# Patient Record
(demographics unavailable — no encounter records)

---

## 2025-04-19 NOTE — ASSESSMENT
[FreeTextEntry1] : Left knee pain for a month with no injury. PF OA present. Possible arthritic flare up, possible meniscus tear.  At this point, this is a new condition where the diagnosis and the long-term prognosis is uncertain versus an exacerbation of a chronic condition.  *The patient has a history of the following orthopaedic issues which are stable and not being managed on this visit.  right knee bump which she noticed in july 2022.   mri 2023 shows mmt, oa.  pain but improves with pt.   left ankle pain for months.  no injury.  ttp achilles tendon.  achilles intact on exam.  likely tendonitis/ strain. only hurts after she plays tennnis.  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

## 2025-04-19 NOTE — HISTORY OF PRESENT ILLNESS
[de-identified] : 2/3/23:  right knee bump which she noticed in july 2022.  no injury.  no fevers or chills.  no swelling noted.  reports to pain and tightness chaim in the back of the knee. 2/10/23:  improved but still has pain.   7/21/23:   right knee pain with activities.   now left ankle pain for months.  no injury.  8/25/23:   follow up right knee.  improving with pt but still pain.   hasnt started on pt for left ankle due to insurance.  also some left knee soreness from compensating.  04/18/25: Right knee painstable.  Increased pain left knee.

## 2025-04-19 NOTE — DISCUSSION/SUMMARY
[de-identified] : The patient has one or more conditions that would benefit from physical therapy.  The physical therapy is requested to improve any deficit in pain, range of motion, strength and other problems in the affected body part(s) as noted in the physical examination above.  A prescription for physical therapy 2 - 3 times per week for 6 weeks was prescribed for the following body parts. Left knee.   The patient's orthopaedic condition(s) warrants intermittent use of a prescription strength non-steroidal anti-inflammatory medication (IBUPROFEN 600MG Po Bid PRN).  This medication is associated with risks including but not limited to gastrointestinal irritation, kidney damage, hypertension, and bleeding.  The patient understands and will take medication as prescribed.  The patient will stop the medication and consult a physician as needed if problems arise.  Will schedule the patient for an MRI of the left knee to assess the problem pending insurance authorization if necessary.  The patient has continued symptoms despite various treatment modalities.  The MRI will help to clarify the diagnosis and subsequent treatment plan. Rule out MMT, LMT.   Follow up after MRI.   Will consider corticosteroid injection therapy if symptoms persist.

## 2025-04-19 NOTE — PHYSICAL EXAM
[Right] : right knee [Degenerative change] : Degenerative change [Left] : left knee [NL (0)] : extension 0 degrees [4___] : quadriceps 4[unfilled]/5 [5___] : hamstring 5[unfilled]/5 [] : light touch is intact throughout [TWNoteComboBox7] : flexion 120 degrees

## 2025-04-19 NOTE — DISCUSSION/SUMMARY
[de-identified] : The patient has one or more conditions that would benefit from physical therapy.  The physical therapy is requested to improve any deficit in pain, range of motion, strength and other problems in the affected body part(s) as noted in the physical examination above.  A prescription for physical therapy 2 - 3 times per week for 6 weeks was prescribed for the following body parts. Left knee.   The patient's orthopaedic condition(s) warrants intermittent use of a prescription strength non-steroidal anti-inflammatory medication (IBUPROFEN 600MG Po Bid PRN).  This medication is associated with risks including but not limited to gastrointestinal irritation, kidney damage, hypertension, and bleeding.  The patient understands and will take medication as prescribed.  The patient will stop the medication and consult a physician as needed if problems arise.  Will schedule the patient for an MRI of the left knee to assess the problem pending insurance authorization if necessary.  The patient has continued symptoms despite various treatment modalities.  The MRI will help to clarify the diagnosis and subsequent treatment plan. Rule out MMT, LMT.   Follow up after MRI.   Will consider corticosteroid injection therapy if symptoms persist.

## 2025-04-19 NOTE — HISTORY OF PRESENT ILLNESS
Pulmonary function test stable     Continue Flovent 110, 2 puffs twice per day with spacer.  Brush teeth afterward      At first sign of illness, start azithromycin x 5 days      Continue albuterol every 4-6 hours as needed for cough/wheeze      Continue all allergy medications    Seek emergency care for increased work of breathing      Return to office again in 3 months [de-identified] : 2/3/23:  right knee bump which she noticed in july 2022.  no injury.  no fevers or chills.  no swelling noted.  reports to pain and tightness chaim in the back of the knee. 2/10/23:  improved but still has pain.   7/21/23:   right knee pain with activities.   now left ankle pain for months.  no injury.  8/25/23:   follow up right knee.  improving with pt but still pain.   hasnt started on pt for left ankle due to insurance.  also some left knee soreness from compensating.  04/18/25: Right knee painstable.  Increased pain left knee.

## 2025-05-06 NOTE — PHYSICAL EXAM
[Left] : left knee [NL (0)] : extension 0 degrees [4___] : quadriceps 4[unfilled]/5 [5___] : hamstring 5[unfilled]/5 [] : light touch is intact throughout [TWNoteComboBox7] : flexion 120 degrees

## 2025-05-06 NOTE — HISTORY OF PRESENT ILLNESS
[de-identified] : 2/3/23:  right knee bump which she noticed in july 2022.  no injury.  no fevers or chills.  no swelling noted.  reports to pain and tightness chaim in the back of the knee. 2/10/23:  improved but still has pain.   7/21/23:   right knee pain with activities.   now left ankle pain for months.  no injury.  8/25/23:   follow up right knee.  improving with pt but still pain.   hasnt started on pt for left ankle due to insurance.  also some left knee soreness from compensating.  04/18/25: Right knee painstable.  Increased pain left knee. 05/06/25 - follow up for MRI review of left knee pain.  continued left knee pain.

## 2025-05-06 NOTE — DATA REVIEWED
[MRI] : MRI [Left] : left [Knee] : knee [I independently reviewed and interpreted images and report] : I independently reviewed and interpreted images and report [FreeTextEntry1] : MRI Left knee (OC) 4/21/25 - severe pf oa, mmt, effusion, bakers cyst

## 2025-05-06 NOTE — ASSESSMENT
[FreeTextEntry1] : Left knee pain for a month with no injury.  PF OA present.  MRI Left knee (OC) 4/21/25 - severe pf oa, mmt, effusion, bakers cyst.  Continued knee pain.    *The patient has a history of the following orthopaedic issues which are stable and not being managed on this visit.  - right knee bump which she noticed in july 2022.   mri 2023 shows mmt, oa.  pain but improves with pt.  - left ankle pain for months.  no injury.  ttp achilles tendon.  achilles intact on exam.  likely tendonitis/ strain. only hurts after she plays tennnis.  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

## 2025-05-06 NOTE — DISCUSSION/SUMMARY
[de-identified] : The patient has one or more conditions that would benefit from physical therapy.  The physical therapy is requested to improve any deficit in pain, range of motion, strength and other problems in the affected body part(s) as noted in the physical examination above.  A prescription for physical therapy 2 - 3 times per week for 6 weeks was prescribed for the following body parts.  Left knee.   The patient should perform a home exercise program as directed. The patient should focus on the body parts affected as discussed above.  Left knee.   The patient's orthopaedic condition(s) warrants consideration of consistent or intermittent use of a prescription strength non-steroidal anti-inflammatory medication (IBUPROFEN 600MG po BID prn).  This medication is associated with risks including but not limited to gastrointestinal irritation, kidney damage, hypertension, and bleeding. The patient notes they already have a valid prescription for the medication. The patient understands the risks and will take medication as prescribed previously.  The patient will stop the medication and consult a physician as needed if problems arise.  Injection(s) as noted in procedure narrative.  Follow up in 6 weeks.

## 2025-05-06 NOTE — PROCEDURE
[FreeTextEntry3] : The patient has continued pain despite attempts at treatment including but not limited to rest, ice, aspirin, Ibuprofen, Aleve, Tylenol etc or prescription NSAIDS, and/or exercises at home and/ or physical therapy without significant improvement.  As a result, a cortisone injection was performed in the office. The risks, benefits, and alternatives of a cortisone injection were explained in full to the patient. Risks outlined include but are not limited to infection, sepsis, bleeding, scarring, skin discoloration, temporary increase in pain, syncopal episode, failure to resolve symptoms, allergic reaction, symptom recurrence, and elevation of blood sugar in diabetics. The patient understood the risks. All questions were answered.   Oral informed consent was obtained.  The medication used was 3 cc of 1% Lidocaine, 3 cc of .5% Marcaine and 2 cc or 12mg of Celestone.  The injection site was cleaned using betadine or alcohol to sterilize the area. Sterile technique was utilized for the procedure including the preparation of the solutions used for the injection. The medication was injected into the LEFT KNEE JOINT.  A bandage or band aid was applied after the injection.  The patient tolerated the procedure well and was advised to ice the injection site this evening.  Other post procedure instructions were also discussed including to call if redness, pain, fever, or any other problems occur and to apply ice for 15 min. out of every hour for the next 12-24 hours as tolerated. The patient was advised to rest the joint(s) for 2 days..    The injection was performed using ultrasound guidance to confirm accurate placement of the needle into the appropriate position. Visualization of the needle and placement of the injection was performed without complication as noted above.

## 2025-06-18 NOTE — ASSESSMENT
[FreeTextEntry1] : Left knee pain for a month with no injury.  PF OA present.  MRI Left knee (OC) 4/21/25 - severe pf oa, mmt, effusion, bakers cyst.   CSI on 5/6/25 with relief.  Improving with PT.    *The patient has a history of the following orthopaedic issues which are stable and not being managed on this visit.  - right knee bump which she noticed in july 2022.   mri 2023 shows mmt, oa.  pain but improves with pt.  - left ankle pain for months.  no injury.  ttp achilles tendon.  achilles intact on exam.  likely tendonitis/ strain. only hurts after she plays tennnis.  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

## 2025-06-18 NOTE — HISTORY OF PRESENT ILLNESS
[de-identified] : 2/3/23:  right knee bump which she noticed in july 2022.  no injury.  no fevers or chills.  no swelling noted.  reports to pain and tightness chaim in the back of the knee. 2/10/23:  improved but still has pain.   7/21/23:   right knee pain with activities.   now left ankle pain for months.  no injury.  8/25/23:   follow up right knee.  improving with pt but still pain.   hasnt started on pt for left ankle due to insurance.  also some left knee soreness from compensating.  04/18/25: Right knee painstable.  Increased pain left knee. 05/06/25 - follow up for MRI review of left knee pain.  continued left knee pain. 06/17/25- Improving with physical therapy. stated pain is medial of knee. no tightness.

## 2025-06-18 NOTE — DISCUSSION/SUMMARY
[de-identified] : The patient has one or more conditions that would benefit from physical therapy.  The physical therapy is requested to improve any deficit in pain, range of motion, strength and other problems in the affected body part(s) as noted in the physical examination above.  A prescription for physical therapy 2 - 3 times per week for 6 weeks was prescribed for the following body parts.  Left knee.   The patient should perform a home exercise program as directed. The patient should focus on the body parts affected as discussed above.  The patient's orthopaedic condition(s) warrants consideration of consistent or intermittent use of a prescription strength non-steroidal anti-inflammatory medication (IBUPROFEN 600MG po BID prn).  This medication is associated with risks including but not limited to gastrointestinal irritation, kidney damage, hypertension, and bleeding. The patient notes they already have a valid prescription for the medication. The patient understands the risks and will take medication as prescribed previously.  The patient will stop the medication and consult a physician as needed if problems arise.  Follow up in 6-8 weeks.

## 2025-06-18 NOTE — PHYSICAL EXAM
[TWNoteComboBox7] : flexion 130 degrees
[TWNoteComboBox7] : flexion 130 degrees
Attending Attestation (For Attendings USE Only)...

## 2025-06-18 NOTE — DISCUSSION/SUMMARY
[de-identified] : The patient has one or more conditions that would benefit from physical therapy.  The physical therapy is requested to improve any deficit in pain, range of motion, strength and other problems in the affected body part(s) as noted in the physical examination above.  A prescription for physical therapy 2 - 3 times per week for 6 weeks was prescribed for the following body parts.  Left knee.   The patient should perform a home exercise program as directed. The patient should focus on the body parts affected as discussed above.  The patient's orthopaedic condition(s) warrants consideration of consistent or intermittent use of a prescription strength non-steroidal anti-inflammatory medication (IBUPROFEN 600MG po BID prn).  This medication is associated with risks including but not limited to gastrointestinal irritation, kidney damage, hypertension, and bleeding. The patient notes they already have a valid prescription for the medication. The patient understands the risks and will take medication as prescribed previously.  The patient will stop the medication and consult a physician as needed if problems arise.  Follow up in 6-8 weeks.

## 2025-06-18 NOTE — HISTORY OF PRESENT ILLNESS
[de-identified] : 2/3/23:  right knee bump which she noticed in july 2022.  no injury.  no fevers or chills.  no swelling noted.  reports to pain and tightness chaim in the back of the knee. 2/10/23:  improved but still has pain.   7/21/23:   right knee pain with activities.   now left ankle pain for months.  no injury.  8/25/23:   follow up right knee.  improving with pt but still pain.   hasnt started on pt for left ankle due to insurance.  also some left knee soreness from compensating.  04/18/25: Right knee painstable.  Increased pain left knee. 05/06/25 - follow up for MRI review of left knee pain.  continued left knee pain. 06/17/25- Improving with physical therapy. stated pain is medial of knee. no tightness.

## 2025-07-29 NOTE — REASON FOR VISIT
Problem: Mobility Impaired (Adult and Pediatric)  Goal: *Acute Goals and Plan of Care (Insert Text)  LTG:  (1.)Mr. Stef Elias will move from supine to sit and sit to supine , scoot up and down and roll side to side INDEPENDENTLY with bed flat within 7 treatment day(s). (2.)Mr. Stef Elias will transfer from bed to chair and chair to bed INDEPENDENTLY within 7 treatment day(s). (3.)Mr. Stef Elias will ambulate with MODIFIED INDEPENDENCE for 250 feet with the least restrictive device within 7 treatment day(s). (4.)Mr. Stef Elias will ascend and descend 4 steps with SUPERVISION using handrail(s) within 7 days. (5.)Mr. Setf Elias will perform exercises per HEP to improve strength and mobility within 7 days. ________________________________________________________________________________________________    PHYSICAL THERAPY: Initial Assessment, AM 7/11/2018  INPATIENT: Hospital Day: 2  Payor: Sylvia Ansari / Plan: [address] [place] [address] HMO / Product Type: [employer] Medicare /      NAME/AGE/GENDER: Barbara Do is a 76 y.o. male   PRIMARY DIAGNOSIS: CHF (congestive heart failure) ([facility]  CHF (congestive heart failure) ([facility] <principal problem not specified> <principal problem not specified>        ICD-10: Treatment Diagnosis:    · Generalized Muscle Weakness (M62.81)  · Other abnormalities of gait and mobility (R26.89)   Precaution/Allergies:  Pcn [penicillins]      ASSESSMENT:     Mr. Stef Elias is a 76year old male admitted from home for CHF (LE swelling, shortness of breath). He presents sitting up in bed without complaints and is agreeable to therapy assessment. On room air when therapist arrived with sats 99%. Transfers to sitting with supervision. Pt performs transfers and mobility in room and hallway with close CGA and verbal cues for safety, posture, walker management. Tends to demonstrate forward flexed posture with increased reliance on UEs and he fatigues quickly.  Navigates [de-identified]' x2 with walker and needs a standing rest break due to fatigue. Returned to room and up to edge of bed with OT arriving for assessment. O2 sats remain 99% after activity despite pt appearing slightly short of breath. Kingston Lo. is functioning below baseline at this time; currently demonstrating deficits with strength, balance, and activity tolerance. Will benefit from continued therapy during acute care stay to maximize safety and independence for discharge. Anticipate home with family and [facility]. This section established at most recent assessment   PROBLEM LIST (Impairments causing functional limitations):  1. Decreased Strength  2. Decreased ADL/Functional Activities  3. Decreased Transfer Abilities  4. Decreased Ambulation Ability/Technique  5. Decreased Balance  6. Decreased Activity Tolerance  7. Increased Shortness of Breath   INTERVENTIONS PLANNED: (Benefits and precautions of physical therapy have been discussed with the patient.)  1. Balance Exercise  2. Bed Mobility  3. Gait Training  4. Home Exercise Program (HEP)  5. Therapeutic Activites  6. Therapeutic Exercise/Strengthening  7. Transfer Training     TREATMENT PLAN: Frequency/Duration: 3 times a week for duration of hospital stay  Rehabilitation Potential For Stated Goals: Good     RECOMMENDED REHABILITATION/EQUIPMENT: (at time of discharge pending progress): Due to the probability of continued deficits (see above) this patient will likely need continued skilled physical therapy after discharge. Equipment:    None at this time              HISTORY:   History of Present Injury/Illness (Reason for Referral):  Per H&P, \"Mr. Harlo Nissen is a 77 yo male who presented with worsening LL swelling on a background of COPD (4L O2 at home), CHF, CAD and HTN. He reports worsening LL swelling for the past 2 weeks. He reports dyspnea on rest and exertion. He sleeps on 2 pillows. Of note, he was recently hospitalized from 6/6 to 6/8 with acute CHF exacerbation. He was started on IV lasix and was discharged at that time on lasix PO BID. At baseline, he lives at home with his wife. He reports that he missed only 1 dose of his home lasix\"    Past Medical History/Comorbidities:   Mr. Amelie Lake  has a past medical history of Acute CHF (congestive heart failure) ([facility] [address] (4/25/2015); Acute combined systolic and diastolic congestive heart failure ([facility] [address] (4/28/2015); Chronic obstructive pulmonary disease ([facility] [address] [facility] De Quervain's tenosynovitis, right (4/28/2015); Dyspnea on exertion (6/28/2015); Elevated serum creatinine (4/25/2015); Elevated troponin (6/28/2015); History of coronary artery disease; History of right knee surgery; History of shingles; Malignant hypertension (4/25/2015); and MI [facility]myocardial infarction) [facility] Mr. Amelie Lake  has a past surgical history that includes hx knee arthroscopy (Right) and hx heart catheterization (6/29/2015). Social History/Living Environment:   Home Environment: Private residence  # Steps to Enter: 4  Rails to Enter: Yes  Hand Rails : Bilateral  Wheelchair Ramp: No  One/Two Story Residence: One story  Living Alone: No  Support Systems: Spouse/Significant Other/Partner, Family member(s)  Patient Expects to be Discharged to[de-identified] Private residence  Current DME Used/Available at Home: Parma Ehrich, rollator  Tub or Shower Type: Shower  Prior Level of Function/Work/Activity:  Corinna Gamez. lives with wife in single story home with 4 steps. Independent to mod I with occasional rollator use. Occasional O2 (rare). Pt drives and denies falls. Wife can help with ADLs if needed. Number of Personal Factors/Comorbidities that affect the Plan of Care: 1-2: MODERATE COMPLEXITY   EXAMINATION:   Most Recent Physical Functioning:   Gross Assessment:  AROM: Within functional limits  Strength: Generally decreased, functional  Coordination: Within functional limits               Posture:  Posture (WDL): Exceptions to WDL  Posture Assessment:  Forward head, Rounded shoulders, Trunk flexion  Balance:  Sitting: Intact  Standing: Impaired  Standing - Static: Fair  Standing - Dynamic : Fair Bed Mobility:  Rolling: Supervision  Supine to Sit: Supervision  Scooting: Supervision  Wheelchair Mobility:     Transfers:  Sit to Stand: Contact guard assistance  Stand to Sit: Contact guard assistance  Bed to Chair: Contact guard assistance  Gait:     Base of Support: Widened;Center of gravity altered  Speed/Lu: Pace decreased (<100 feet/min); Fluctuations  Step Length: Left shortened;Right shortened  Gait Abnormalities: Trunk sway increased; Path deviations;Decreased step clearance  Distance (ft): 80 Feet (ft) (x2 with standing rest break)  Assistive Device: Walker, rolling  Ambulation - Level of Assistance: Contact guard assistance  Interventions: Verbal cues; Visual/Demos; Safety awareness training; Tactile cues      Body Structures Involved:  1. Heart  2. Muscles Body Functions Affected:  1. Movement Related Activities and Participation Affected:  1. General Tasks and Demands  2. Mobility  3. Self Care  4. Domestic Life  5. Community, Social and Will [place]   Number of elements that affect the Plan of Care: 4+: HIGH COMPLEXITY   CLINICAL PRESENTATION:   Presentation: Stable and uncomplicated: LOW COMPLEXITY   CLINICAL DECISION MAKIN [facility] Inpatient Short Form  How much difficulty does the patient currently have. .. Unable A Lot A Little None   1. Turning over in bed (including adjusting bedclothes, sheets and blankets)? [] 1   [] 2   [] 3   [x] 4   2. Sitting down on and standing up from a chair with arms ( e.g., wheelchair, bedside commode, etc.)   [] 1   [] 2   [] 3   [x] 4   3. Moving from lying on back to sitting on the side of the bed? [] 1   [] 2   [] 3   [x] 4   How much help from another person does the patient currently need. .. Total A Lot A Little None   4. Moving to and from a bed to a chair (including a wheelchair)? [] 1   [] 2   [x] 3   [] 4   5.   Need to walk in hospital room? [] 1   [] 2   [x] 3   [] 4   6. Climbing 3-5 steps with a railing? [] 1   [] 2   [x] 3   [] 4   © 2007, Trustees of [facility] [employer] [employer], under license to [employer]. All rights reserved      Score:  Initial: 21 Most Recent: X (Date: -- )    Interpretation of Tool:  Represents activities that are increasingly more difficult (i.e. Bed mobility, Transfers, Gait). Score 24 23 22-20 19-15 14-10 9-7 6     Modifier CH CI CJ CK CL CM CN      ? Mobility - Walking and Moving Around:     - CURRENT STATUS: CJ - 20%-39% impaired, limited or restricted    - GOAL STATUS: CI - 1%-19% impaired, limited or restricted    - D/C STATUS:  ---------------To be determined---------------  Payor: Sylvia Ansari / Plan: [address] [place] [address] [employer] / Product Type: Managed Care Medicare /      Medical Necessity:     · Patient demonstrates good rehab potential due to higher previous functional level. Reason for Services/Other Comments:  · Patient continues to demonstrate capacity to improve strength, mobility, balance, activity tolerance which will increase independence, decrease amount of assistance required from caregiver and increase safety. Use of outcome tool(s) and clinical judgement create a POC that gives a: Clear prediction of patient's progress: LOW COMPLEXITY            TREATMENT:   (In addition to Assessment/Re-Assessment sessions the following treatments were rendered)   Pre-treatment Symptoms/Complaints:  \"ok\"  Pain: Initial:   Pain Intensity 1: 0  Post Session:  0/10     Assessment/Reassessment only, no treatment provided today    Braces/Orthotics/Lines/Etc:   · O2 Device: Room air  Treatment/Session Assessment:    · Response to Treatment:  Pt performs mobility with CGA in room and hallway with walker. Fatigues quickly.  99% on room air  · Interdisciplinary Collaboration:   o Physical Therapist  o Registered Nurse  o   · After treatment position/precautions:   o pt left sitting up with OT arriving for assessment   · Compliance with Program/Exercises: Will assess as treatment progresses. · Recommendations/Intent for next treatment session: \"Next visit will focus on advancements to more challenging activities and reduction in assistance provided\".   Total Treatment Duration:  PT Patient Time In/Time Out  Time In: 0907  Time Out: [facility] [FreeTextEntry2] : Left knee pain.

## 2025-07-29 NOTE — DISCUSSION/SUMMARY
[de-identified] : The patient has documented cartilage loss and osteoarthritis in one or more joints.  The patient has persistent symptoms despite attempts at treatment to this date including activity modification, medications and other treatments.  The patient is a candidate for Visco supplementation to treat the osteoarthritis.  The risks, benefits, alternatives and the nature of the treatment have been discussed with the patient including but not limited to infection, continued pain, and failure of treatment. We will request authorization to administer Visco supplementation therapy.  The following medication is requested. Euflexxa left knee.   The patient should perform a home exercise program as directed. The patient should focus on the body parts affected as discussed above.  Will schedule the patient for an MRI of the left knee to assess the problem pending insurance authorization if necessary.  The patient has continued symptoms despite various treatment modalities.  The MRI will help to clarify the diagnosis and subsequent treatment plan. Rule out MMT progression.   The patient's orthopaedic condition(s) warrants consideration of consistent or intermittent use of a prescription strength non-steroidal anti-inflammatory medication (IBUPROFEN 600MG po BID prn).  This medication is associated with risks including but not limited to gastrointestinal irritation, kidney damage, hypertension, and bleeding. The patient notes they already have a valid prescription for the medication. The patient understands the risks and will take medication as prescribed previously.  The patient will stop the medication and consult a physician as needed if problems arise.  Follow up after MRI.

## 2025-07-29 NOTE — ASSESSMENT
[FreeTextEntry1] : Left knee pain for a month with no injury.  PF OA present.  MRI Left knee (OC) 4/21/25 - severe pf oa, mmt, effusion, bakers cyst.   CSI on 5/6/25 with short-term relief.  Was improving with PT but increased pain recently after playing tennis. Mostly medial pain. Possible meniscus tear progression, possible osteoarthritis exacerbation, possible occult injury. This is likely an exacerbation of a chronic condition or a new condition with an unknown diagnosis and long term prognosis.  *The patient has a history of the following orthopaedic issues which are stable and not being managed on this visit.  - right knee bump which she noticed in july 2022.   mri 2023 shows mmt, oa.  pain but improves with pt.  - left ankle pain for months.  no injury.  ttp achilles tendon.  achilles intact on exam.  likely tendonitis/ strain. only hurts after she plays tennnis.  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

## 2025-07-29 NOTE — PHYSICAL EXAM
[Left] : left knee [NL (0)] : extension 0 degrees [4___] : quadriceps 4[unfilled]/5 [5___] : hamstring 5[unfilled]/5 [] : medial joint line tenderness [TWNoteComboBox7] : flexion 130 degrees

## 2025-07-29 NOTE — HISTORY OF PRESENT ILLNESS
[de-identified] : 2/3/23:  right knee bump which she noticed in july 2022.  no injury.  no fevers or chills.  no swelling noted.  reports to pain and tightness chaim in the back of the knee. 2/10/23:  improved but still has pain.   7/21/23:   right knee pain with activities.   now left ankle pain for months.  no injury.  8/25/23:   follow up right knee.  improving with pt but still pain.   hasnt started on pt for left ankle due to insurance.  also some left knee soreness from compensating.  04/18/25: Right knee painstable.  Increased pain left knee. 05/06/25 - follow up for MRI review of left knee pain.  continued left knee pain. 06/17/25- Improving with physical therapy. stated pain is medial of knee. no tightness.  07/29/2025 Follow up left knee, increased pain recently after playing tennis.

## 2025-07-29 NOTE — HISTORY OF PRESENT ILLNESS
[de-identified] : 2/3/23:  right knee bump which she noticed in july 2022.  no injury.  no fevers or chills.  no swelling noted.  reports to pain and tightness chaim in the back of the knee. 2/10/23:  improved but still has pain.   7/21/23:   right knee pain with activities.   now left ankle pain for months.  no injury.  8/25/23:   follow up right knee.  improving with pt but still pain.   hasnt started on pt for left ankle due to insurance.  also some left knee soreness from compensating.  04/18/25: Right knee painstable.  Increased pain left knee. 05/06/25 - follow up for MRI review of left knee pain.  continued left knee pain. 06/17/25- Improving with physical therapy. stated pain is medial of knee. no tightness.  07/29/2025 Follow up left knee, increased pain recently after playing tennis.

## 2025-07-29 NOTE — DISCUSSION/SUMMARY
[de-identified] : The patient has documented cartilage loss and osteoarthritis in one or more joints.  The patient has persistent symptoms despite attempts at treatment to this date including activity modification, medications and other treatments.  The patient is a candidate for Visco supplementation to treat the osteoarthritis.  The risks, benefits, alternatives and the nature of the treatment have been discussed with the patient including but not limited to infection, continued pain, and failure of treatment. We will request authorization to administer Visco supplementation therapy.  The following medication is requested. Euflexxa left knee.   The patient should perform a home exercise program as directed. The patient should focus on the body parts affected as discussed above.  Will schedule the patient for an MRI of the left knee to assess the problem pending insurance authorization if necessary.  The patient has continued symptoms despite various treatment modalities.  The MRI will help to clarify the diagnosis and subsequent treatment plan. Rule out MMT progression.   The patient's orthopaedic condition(s) warrants consideration of consistent or intermittent use of a prescription strength non-steroidal anti-inflammatory medication (IBUPROFEN 600MG po BID prn).  This medication is associated with risks including but not limited to gastrointestinal irritation, kidney damage, hypertension, and bleeding. The patient notes they already have a valid prescription for the medication. The patient understands the risks and will take medication as prescribed previously.  The patient will stop the medication and consult a physician as needed if problems arise.  Follow up after MRI.